# Patient Record
Sex: FEMALE
[De-identification: names, ages, dates, MRNs, and addresses within clinical notes are randomized per-mention and may not be internally consistent; named-entity substitution may affect disease eponyms.]

---

## 2021-01-28 PROBLEM — Z00.00 ENCOUNTER FOR PREVENTIVE HEALTH EXAMINATION: Status: ACTIVE | Noted: 2021-01-28

## 2021-01-29 ENCOUNTER — APPOINTMENT (OUTPATIENT)
Dept: OBGYN | Facility: CLINIC | Age: 36
End: 2021-01-29
Payer: MEDICAID

## 2021-01-29 ENCOUNTER — ASOB RESULT (OUTPATIENT)
Age: 36
End: 2021-01-29

## 2021-01-29 VITALS
SYSTOLIC BLOOD PRESSURE: 104 MMHG | TEMPERATURE: 97.8 F | BODY MASS INDEX: 24.48 KG/M2 | DIASTOLIC BLOOD PRESSURE: 75 MMHG | HEART RATE: 76 BPM | HEIGHT: 67 IN | WEIGHT: 156 LBS

## 2021-01-29 DIAGNOSIS — N92.6 IRREGULAR MENSTRUATION, UNSPECIFIED: ICD-10-CM

## 2021-01-29 DIAGNOSIS — N72 INFLAMMATORY DISEASE OF CERVIX UTERI: ICD-10-CM

## 2021-01-29 DIAGNOSIS — Z71.89 OTHER SPECIFIED COUNSELING: ICD-10-CM

## 2021-01-29 DIAGNOSIS — Z01.419 ENCOUNTER FOR GYNECOLOGICAL EXAMINATION (GENERAL) (ROUTINE) W/OUT ABNORMAL FINDINGS: ICD-10-CM

## 2021-01-29 DIAGNOSIS — N76.0 ACUTE VAGINITIS: ICD-10-CM

## 2021-01-29 DIAGNOSIS — Z83.3 FAMILY HISTORY OF DIABETES MELLITUS: ICD-10-CM

## 2021-01-29 DIAGNOSIS — Z78.9 OTHER SPECIFIED HEALTH STATUS: ICD-10-CM

## 2021-01-29 DIAGNOSIS — Z12.4 ENCOUNTER FOR SCREENING FOR MALIGNANT NEOPLASM OF CERVIX: ICD-10-CM

## 2021-01-29 DIAGNOSIS — Z86.2 PERSONAL HISTORY OF DISEASES OF THE BLOOD AND BLOOD-FORMING ORGANS AND CERTAIN DISORDERS INVOLVING THE IMMUNE MECHANISM: ICD-10-CM

## 2021-01-29 DIAGNOSIS — B96.89 ACUTE VAGINITIS: ICD-10-CM

## 2021-01-29 PROCEDURE — 76830 TRANSVAGINAL US NON-OB: CPT

## 2021-01-29 PROCEDURE — 99072 ADDL SUPL MATRL&STAF TM PHE: CPT

## 2021-01-29 PROCEDURE — 99385 PREV VISIT NEW AGE 18-39: CPT | Mod: 25

## 2021-01-29 PROCEDURE — 17250 CHEM CAUT OF GRANLTJ TISSUE: CPT

## 2021-01-29 RX ORDER — METRONIDAZOLE 7.5 MG/G
0.75 GEL VAGINAL
Qty: 1 | Refills: 1 | Status: ACTIVE | COMMUNITY
Start: 2021-01-29 | End: 1900-01-01

## 2021-02-05 PROBLEM — N92.6 IRREGULAR UTERINE BLEEDING: Status: ACTIVE | Noted: 2021-01-29

## 2021-02-06 LAB
C TRACH RRNA SPEC QL NAA+PROBE: NOT DETECTED
CYTOLOGY CVX/VAG DOC THIN PREP: NORMAL
HPV HIGH+LOW RISK DNA PNL CVX: NOT DETECTED
N GONORRHOEA RRNA SPEC QL NAA+PROBE: NOT DETECTED
SOURCE AMPLIFICATION: NORMAL
SOURCE AMPLIFICATION: NORMAL
T VAGINALIS RRNA SPEC QL NAA+PROBE: NOT DETECTED

## 2021-02-28 PROBLEM — Z86.2 HISTORY OF ANEMIA: Status: RESOLVED | Noted: 2021-02-28 | Resolved: 2021-02-28

## 2021-02-28 PROBLEM — Z83.3 FAMILY HISTORY OF DIABETES MELLITUS: Status: ACTIVE | Noted: 2021-02-28

## 2021-02-28 PROBLEM — N76.0 BACTERIAL VAGINOSIS: Status: ACTIVE | Noted: 2021-01-29

## 2021-02-28 PROBLEM — N72 CERVICITIS: Status: ACTIVE | Noted: 2021-02-28

## 2021-02-28 PROBLEM — Z71.89 OTHER SPECIFIED COUNSELING: Status: ACTIVE | Noted: 2021-02-28

## 2021-02-28 PROBLEM — Z12.4 ENCOUNTER FOR SCREENING FOR CERVICAL CANCER: Status: ACTIVE | Noted: 2021-01-29

## 2021-02-28 PROBLEM — Z78.9 DOES NOT USE TOBACCO: Status: ACTIVE | Noted: 2021-02-28

## 2021-02-28 PROBLEM — Z78.9 DOES NOT USE ILLICIT DRUGS: Status: ACTIVE | Noted: 2021-02-28

## 2021-02-28 PROBLEM — Z78.9 DENIES ALCOHOL CONSUMPTION: Status: ACTIVE | Noted: 2021-02-28

## 2021-02-28 PROBLEM — Z78.9 EXERCISES OCCASIONALLY: Status: ACTIVE | Noted: 2021-02-28

## 2021-02-28 PROBLEM — Z01.419 ENCOUNTER FOR ANNUAL ROUTINE GYNECOLOGICAL EXAMINATION: Status: RESOLVED | Noted: 2021-02-28 | Resolved: 2021-03-14

## 2021-02-28 RX ORDER — FERROUS SULFATE 325(65) MG
TABLET ORAL
Refills: 0 | Status: ACTIVE | COMMUNITY

## 2021-02-28 NOTE — HISTORY OF PRESENT ILLNESS
[Patient reported PAP Smear was normal] : Patient reported PAP Smear was normal [Normal Amount/Duration] :  normal amount and duration [Menarche Age: ____] : age at menarche was [unfilled] [Currently Active] : currently active [Men] : men [Vaginal] : vaginal [HIV test declined] : HIV test declined [Syphilis test declined] : Syphilis test declined [Gonorrhea test offered] : Gonorrhea test offered [Chlamydia test offered] : Chlamydia test offered [Trichomonas test offered] : Trichomonas test offered [HPV test offered] : HPV test offered [Hepatitis B test declined] : Hepatitis B test declined [Hepatitis C test declined] : Hepatitis C test declined [Y] : Patient uses contraception [Tubal Occlusion] : has had a tubal occlusion [Regular Cycle Intervals] : periods have been regular [No] : No [TextBox_4] : 35yo  with LMP 1/16/21 came for annual GYN exam and pap smear.  She is also concerned about having intermittent post intercourse bleeding, has had a few episodes over the last few months.  She denies AUB, pelvic pain, discharge, dysuria or other GYN complaints.  She denies h/o abnl pap/HPV, STDs, fibroids or cysts.  She is sexually active with one male partner 0 - no condoms, has had permanent sterilization for contraception.  [Mammogramdate] : n/a [PapSmeardate] : 6 Yrs ago [LMPDate] : 01/16/21 [PGHxTotal] : 3 [[facility]] : 2 [PGxPremature] : 1 [PGHxAbortions] : 0 [[facility]] : 3 [PGHxABInduced] : 0 [PGHxABSpont] : 0 [PGHxEctopic] : 0 [PGHxMultBirths] : 0 [FreeTextEntry1] : 01/16/21

## 2021-02-28 NOTE — COUNSELING
[Nutrition/ Exercise/ Weight Management] : nutrition, exercise, weight management [Vitamins/Supplements] : vitamins/supplements [Breast Self Exam] : breast self exam [Bladder Hygiene] : bladder hygiene [Contraception/ Emergency Contraception/ Safe Sexual Practices] : contraception, emergency contraception, safe sexual practices [STD (testing, results, tx)] : STD (testing, results, tx) [Medication Management] : medication management

## 2021-02-28 NOTE — DISCUSSION/SUMMARY
[FreeTextEntry1] : A: 35yo for annual GYN exam, postcoital bleeding - cervicitis, BV\par \par P: GYN Exam done\par     pap smear done\par     chemical cautery\par      metrogel rx given\par      safe sex practices\par      pain and bleeding precautions\par      encourage healthy diet and exercise\par      f/u 1 yr or PRN

## 2021-02-28 NOTE — PHYSICAL EXAM
[Appropriately responsive] : appropriately responsive [Alert] : alert [No Acute Distress] : no acute distress [No Lymphadenopathy] : no lymphadenopathy [Regular Rate Rhythm] : regular rate rhythm [Soft] : soft [Non-tender] : non-tender [Non-distended] : non-distended [No Mass] : no mass [Oriented x3] : oriented x3 [Examination Of The Breasts] : a normal appearance [No Discharge] : no discharge [No Masses] : no breast masses were palpable [Normal] : normal external genitalia [No Lesions] : no lesions  [Labia Majora] : normal [Labia Minora] : normal [No Bleeding] : There was no active vaginal bleeding [Tenderness] : nontender [Enlarged ___ wks] : not enlarged [Mass ___ cm] : no uterine mass was palpated [Uterine Adnexae] : normal [FreeTextEntry4] : discharge with slight odor [FreeTextEntry5] : cervicitis with excoriation noted and bleeding with toch, pap smear done, silver nitrate used for hemostasis

## 2021-03-01 ENCOUNTER — NON-APPOINTMENT (OUTPATIENT)
Age: 36
End: 2021-03-01